# Patient Record
Sex: FEMALE | Race: WHITE | NOT HISPANIC OR LATINO | URBAN - METROPOLITAN AREA
[De-identification: names, ages, dates, MRNs, and addresses within clinical notes are randomized per-mention and may not be internally consistent; named-entity substitution may affect disease eponyms.]

---

## 2017-03-02 ENCOUNTER — OUTPATIENT (OUTPATIENT)
Dept: OUTPATIENT SERVICES | Facility: HOSPITAL | Age: 32
LOS: 1 days | Discharge: HOME | End: 2017-03-02

## 2017-06-27 DIAGNOSIS — N84.0 POLYP OF CORPUS UTERI: ICD-10-CM

## 2017-06-27 DIAGNOSIS — N80.0 ENDOMETRIOSIS OF UTERUS: ICD-10-CM

## 2017-06-27 DIAGNOSIS — R10.2 PELVIC AND PERINEAL PAIN: ICD-10-CM

## 2018-02-17 ENCOUNTER — TRANSCRIPTION ENCOUNTER (OUTPATIENT)
Age: 33
End: 2018-02-17

## 2018-05-08 ENCOUNTER — OUTPATIENT (OUTPATIENT)
Dept: OUTPATIENT SERVICES | Facility: HOSPITAL | Age: 33
LOS: 1 days | Discharge: HOME | End: 2018-05-08

## 2018-05-08 DIAGNOSIS — R92.8 OTHER ABNORMAL AND INCONCLUSIVE FINDINGS ON DIAGNOSTIC IMAGING OF BREAST: ICD-10-CM

## 2018-06-28 ENCOUNTER — TRANSCRIPTION ENCOUNTER (OUTPATIENT)
Age: 33
End: 2018-06-28

## 2019-05-22 PROBLEM — Z00.00 ENCOUNTER FOR PREVENTIVE HEALTH EXAMINATION: Status: ACTIVE | Noted: 2019-05-22

## 2020-04-07 ENCOUNTER — OUTPATIENT (OUTPATIENT)
Dept: OUTPATIENT SERVICES | Facility: HOSPITAL | Age: 35
LOS: 1 days | Discharge: HOME | End: 2020-04-07

## 2020-04-07 DIAGNOSIS — F43.23 ADJUSTMENT DISORDER WITH MIXED ANXIETY AND DEPRESSED MOOD: ICD-10-CM

## 2020-12-30 ENCOUNTER — APPOINTMENT (OUTPATIENT)
Dept: NEUROLOGY | Facility: CLINIC | Age: 35
End: 2020-12-30
Payer: COMMERCIAL

## 2020-12-30 VITALS
BODY MASS INDEX: 29.99 KG/M2 | OXYGEN SATURATION: 98 % | HEIGHT: 65 IN | SYSTOLIC BLOOD PRESSURE: 129 MMHG | DIASTOLIC BLOOD PRESSURE: 78 MMHG | HEART RATE: 79 BPM | WEIGHT: 180 LBS | TEMPERATURE: 97.2 F

## 2020-12-30 DIAGNOSIS — E28.2 POLYCYSTIC OVARIAN SYNDROME: ICD-10-CM

## 2020-12-30 DIAGNOSIS — Z87.891 PERSONAL HISTORY OF NICOTINE DEPENDENCE: ICD-10-CM

## 2020-12-30 DIAGNOSIS — Z80.3 FAMILY HISTORY OF MALIGNANT NEOPLASM OF BREAST: ICD-10-CM

## 2020-12-30 DIAGNOSIS — Z83.49 FAMILY HISTORY OF OTHER ENDOCRINE, NUTRITIONAL AND METABOLIC DISEASES: ICD-10-CM

## 2020-12-30 DIAGNOSIS — Z82.49 FAMILY HISTORY OF ISCHEMIC HEART DISEASE AND OTHER DISEASES OF THE CIRCULATORY SYSTEM: ICD-10-CM

## 2020-12-30 DIAGNOSIS — J45.909 UNSPECIFIED ASTHMA, UNCOMPLICATED: ICD-10-CM

## 2020-12-30 DIAGNOSIS — Z72.89 OTHER PROBLEMS RELATED TO LIFESTYLE: ICD-10-CM

## 2020-12-30 DIAGNOSIS — E03.9 HYPOTHYROIDISM, UNSPECIFIED: ICD-10-CM

## 2020-12-30 DIAGNOSIS — C43.9 MALIGNANT MELANOMA OF SKIN, UNSPECIFIED: ICD-10-CM

## 2020-12-30 DIAGNOSIS — U07.1 COVID-19: ICD-10-CM

## 2020-12-30 DIAGNOSIS — Z87.42 PERSONAL HISTORY OF OTHER DISEASES OF THE FEMALE GENITAL TRACT: ICD-10-CM

## 2020-12-30 DIAGNOSIS — Z82.0 FAMILY HISTORY OF EPILEPSY AND OTHER DISEASES OF THE NERVOUS SYSTEM: ICD-10-CM

## 2020-12-30 DIAGNOSIS — Z82.3 FAMILY HISTORY OF STROKE: ICD-10-CM

## 2020-12-30 DIAGNOSIS — Z80.0 FAMILY HISTORY OF MALIGNANT NEOPLASM OF DIGESTIVE ORGANS: ICD-10-CM

## 2020-12-30 PROCEDURE — 99072 ADDL SUPL MATRL&STAF TM PHE: CPT

## 2020-12-30 PROCEDURE — 99204 OFFICE O/P NEW MOD 45 MIN: CPT

## 2020-12-30 RX ORDER — FAMOTIDINE 40 MG/1
40 TABLET, FILM COATED ORAL
Qty: 30 | Refills: 0 | Status: DISCONTINUED | COMMUNITY
Start: 2020-11-04

## 2020-12-30 RX ORDER — METFORMIN HYDROCHLORIDE 500 MG/1
500 TABLET, FILM COATED ORAL
Refills: 0 | Status: ACTIVE | COMMUNITY

## 2020-12-30 RX ORDER — OMEPRAZOLE 40 MG/1
40 CAPSULE, DELAYED RELEASE ORAL
Qty: 30 | Refills: 0 | Status: DISCONTINUED | COMMUNITY
Start: 2020-10-23

## 2020-12-30 RX ORDER — ONDANSETRON 4 MG/1
4 TABLET, ORALLY DISINTEGRATING ORAL
Qty: 28 | Refills: 0 | Status: DISCONTINUED | COMMUNITY
Start: 2020-11-25

## 2020-12-30 RX ORDER — LEVOTHYROXINE SODIUM 0.09 MG/1
88 TABLET ORAL
Refills: 0 | Status: ACTIVE | COMMUNITY

## 2020-12-30 RX ORDER — METFORMIN ER 500 MG 500 MG/1
500 TABLET ORAL
Qty: 180 | Refills: 0 | Status: DISCONTINUED | COMMUNITY
Start: 2020-12-10

## 2020-12-30 RX ORDER — DICYCLOMINE HYDROCHLORIDE 20 MG/1
20 TABLET ORAL
Qty: 90 | Refills: 0 | Status: ACTIVE | COMMUNITY
Start: 2020-12-21

## 2020-12-30 RX ORDER — FLUTICASONE FUROATE 100 UG/1
100 POWDER RESPIRATORY (INHALATION)
Qty: 30 | Refills: 0 | Status: DISCONTINUED | COMMUNITY
Start: 2020-06-29

## 2020-12-30 RX ORDER — SUCRALFATE 1 G/1
1 TABLET ORAL
Qty: 90 | Refills: 0 | Status: DISCONTINUED | COMMUNITY
Start: 2020-11-16

## 2020-12-30 RX ORDER — METFORMIN ER 750 MG 750 MG/1
750 TABLET ORAL
Qty: 90 | Refills: 0 | Status: COMPLETED | COMMUNITY
Start: 2020-11-30

## 2020-12-30 RX ORDER — LEVOTHYROXINE SODIUM 0.09 MG/1
88 TABLET ORAL
Qty: 90 | Refills: 0 | Status: DISCONTINUED | COMMUNITY
Start: 2020-09-30

## 2020-12-31 NOTE — PHYSICAL EXAM
[FreeTextEntry1] : Short term recall is mildly impaired (-1/5) as is attention and concentration (missed two on serial 7's).  MOCA v1 score was 28/30.  Remote memory, general fund of knowledge,  and language function were intact.  Pupils are equal, round and reactive to light and accommodation.  Funduscopic exam did not show any papilledema.  Visual fields are intact to confrontation.  Extraocular movements are full.  There is no nystagmus.  The facial muscles are symmetric.  Facial sensation is intact to light touch, temperature, and pinprick.  Hearing is intact to finger rub bilaterally.  Tongue is midline.  Palate elevates symmetrically.  Neck is supple.  There is no meningismus.  Shoulder shrugs are symmetric.  Motor exam demonstrates 5/5 strength in the proximal and distal muscles of the upper and lower extremities.  Tone and bulk were normal.  There is no pronator drift.  Reflexes are 2+ bilaterally in the biceps, triceps, brachioradialis, patellae and ankles.  Toes are downgoing.  There is no clonus.  Coordination demonstrates normal finger-to-nose, heel-to-shin and rapid alternating movements.  Gait demonstrates normal heel and toe walk.  Tandem gait is normal.  Sensory exam, normal light touch, pinprick, vibration sensation in upper and lower extremities. \par \par No abnormal tone was detected.  No involuntary movements were seen.\par \par The patient is well-developed, well-nourished female in no acute distress.  Cardiac exam demonstrates a regular rate and rhythm.  No murmurs.  Carotids are 2+ bilaterally.  No bruits.  Abdomen is soft, nontender, and nondistended.  Bowel sounds are present.  Extremities showed no clubbing, cyanosis or edema. \par \par \par

## 2020-12-31 NOTE — ASSESSMENT
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  She will f/u with GI for possible liver biopsy.\par \par Will obtain formal neuropsychologic testing to more fully assess her cognitive complaints and will order MRI brain w/o contrast to look for signal changes associated with Poncho disease.\par \par She will likely benefit from chelation therapy and  molecular testing for ATP7B mutations.\par \par Return in 3-4 weeks.

## 2020-12-31 NOTE — HISTORY OF PRESENT ILLNESS
[FreeTextEntry1] : Pt is 34 yo RH female presenting for general neurologic evaluation.  She reports a recent potential diagnosis of Poncho disease.  Went for eye exam and negative.  Urine test results are back and show elevated urine copper.\par \par States she had all abdominal symptoms, severe pain in stomach on left side diarrhea to constipation.  Nausea was big symptom.  States was having a lot of bile coming out of stomach and mucous around her stool.  Sx's started end of October.  GI specialist did endoscopy, sent stool out and found ceruloplasmin level was low and copper was low.  Did CT of abdomen, HIDA scan, hydro scan now.  Urine test (24 hour urine copper).\par \par When turned 30 has always been anemia, always very high amounts of B12.\par \par Serum copper level was 57 ug/dL ().\par Ceruloplasmin 13 (16-45).\par 24 hour urine copper = 120 mcg/24h (15-60)\par \par Mothers concern - memory loss, can't find her words, forgetting names of people - coworkers. Bad pains in upper and lower back, hip and ankles swollen and unsteady gait.\par \par Family history \par Maternal aunt MS, and mothers cousin with MS\par Fathers cousin with MS\par \par She has been complaining of pain in her spine. (lower back to right of midline, region from lower thoracic to lumbar area).\par \par Mother states years back she had very elevated B12 levels (>2,000) despite not supplementing with B12.  \par

## 2020-12-31 NOTE — REVIEW OF SYSTEMS
[Chills] : chills [Feeling Poorly] : feeling poorly [Feeling Tired] : feeling tired [Recent Weight Loss (___ Lbs)] : recent [unfilled] ~Ulb weight loss [Memory Lapses or Loss] : memory loss [Decr. Concentrating Ability] : decreased concentrating ability [Hand Weakness] :  hand weakness [Leg Weakness] : leg weakness [Poor Coordination] : poor coordination [Dizziness] : dizziness [Lightheadedness] : lightheadedness [Migraine Headache] : migraine headaches [Difficulty Walking] : difficulty walking [Sleep Disturbances] : sleep disturbances [Anxiety] : anxiety [Depression] : depression [Eyes Itch] : itching of the eyes [SOB on Exertion] : shortness of breath during exertion [Abdominal Pain] : abdominal pain [Vomiting] : vomiting [Constipation] : constipation [Diarrhea] : diarrhea [Dysuria] : dysuria [Joint Pain] : joint pain [Joint Swelling] : joint swelling [Joint Stiffness] : joint stiffness [Hot Flashes] : hot flashes [Easy Bleeding] : a tendency for easy bleeding [Easy Bruising] : a tendency for easy bruising [Fever] : no fever [Recent Weight Gain (___ Lbs)] : no recent weight gain [Confused or Disoriented] : no confusion [Facial Weakness] : no facial weakness [Arm Weakness] : no arm weakness [Numbness] : no numbness [Tingling] : no tingling [Seizures] : no convulsions [Fainting] : no fainting [Vertigo] : no vertigo [Cluster Headache] : no cluster headache [Tension Headache] : no tension-type headache [Inability to Walk] : able to walk [Ataxia] : no ataxia [Frequent Falls] : not falling [Suicidal] : not suicidal [Eye Pain] : no eye pain [Eyesight Problems] : no eyesight problems [Earache] : no earache [Loss Of Hearing] : no hearing loss [Heart Rate Is Slow] : the heart rate was not slow [Heart Rate Is Fast] : the heart rate was not fast [Chest Pain] : no chest pain [Palpitations] : no palpitations [Shortness Of Breath] : no shortness of breath [Wheezing] : no wheezing [Cough] : no cough [Heartburn] : no heartburn [Melena] : no melena [Incontinence] : no incontinence [Skin Lesions] : no skin lesions [Skin Wound] : no skin wound [Muscle Weakness] : no muscle weakness [FreeTextEntry2] : sometimes [de-identified] : perimenstrual, takes motrin for headaches.  walking is a little off at times [de-identified] : recent sleep trouble x 2 months, wakes up exhausted [FreeTextEntry7] : over past 2 months [FreeTextEntry9] : hip pain, bilateral .  back pain rigth lower [de-identified] : perimenstrual

## 2021-01-02 ENCOUNTER — OUTPATIENT (OUTPATIENT)
Dept: OUTPATIENT SERVICES | Facility: HOSPITAL | Age: 36
LOS: 1 days | Discharge: HOME | End: 2021-01-02
Payer: COMMERCIAL

## 2021-01-02 DIAGNOSIS — E83.01 WILSON'S DISEASE: ICD-10-CM

## 2021-01-02 PROCEDURE — 70551 MRI BRAIN STEM W/O DYE: CPT | Mod: 26

## 2021-01-04 ENCOUNTER — OUTPATIENT (OUTPATIENT)
Dept: OUTPATIENT SERVICES | Facility: HOSPITAL | Age: 36
LOS: 1 days | Discharge: HOME | End: 2021-01-04
Payer: COMMERCIAL

## 2021-01-04 VITALS
WEIGHT: 179.9 LBS | RESPIRATION RATE: 17 BRPM | HEART RATE: 65 BPM | TEMPERATURE: 97 F | OXYGEN SATURATION: 97 % | SYSTOLIC BLOOD PRESSURE: 115 MMHG | HEIGHT: 65 IN | DIASTOLIC BLOOD PRESSURE: 76 MMHG

## 2021-01-04 DIAGNOSIS — E83.01 WILSON'S DISEASE: ICD-10-CM

## 2021-01-04 DIAGNOSIS — E03.9 HYPOTHYROIDISM, UNSPECIFIED: ICD-10-CM

## 2021-01-04 DIAGNOSIS — J45.909 UNSPECIFIED ASTHMA, UNCOMPLICATED: ICD-10-CM

## 2021-01-04 DIAGNOSIS — Z01.818 ENCOUNTER FOR OTHER PREPROCEDURAL EXAMINATION: ICD-10-CM

## 2021-01-04 DIAGNOSIS — Z98.890 OTHER SPECIFIED POSTPROCEDURAL STATES: Chronic | ICD-10-CM

## 2021-01-04 DIAGNOSIS — K75.9 INFLAMMATORY LIVER DISEASE, UNSPECIFIED: ICD-10-CM

## 2021-01-04 LAB
ALBUMIN SERPL ELPH-MCNC: 4 G/DL — SIGNIFICANT CHANGE UP (ref 3.5–5.2)
ALP SERPL-CCNC: 66 U/L — SIGNIFICANT CHANGE UP (ref 30–115)
ALT FLD-CCNC: 12 U/L — SIGNIFICANT CHANGE UP (ref 0–41)
ANION GAP SERPL CALC-SCNC: 10 MMOL/L — SIGNIFICANT CHANGE UP (ref 7–14)
APTT BLD: 31.6 SEC — SIGNIFICANT CHANGE UP (ref 27–39.2)
AST SERPL-CCNC: 16 U/L — SIGNIFICANT CHANGE UP (ref 0–41)
BASOPHILS # BLD AUTO: 0.07 K/UL — SIGNIFICANT CHANGE UP (ref 0–0.2)
BASOPHILS NFR BLD AUTO: 1.5 % — HIGH (ref 0–1)
BILIRUB SERPL-MCNC: 0.4 MG/DL — SIGNIFICANT CHANGE UP (ref 0.2–1.2)
BUN SERPL-MCNC: 14 MG/DL — SIGNIFICANT CHANGE UP (ref 10–20)
CALCIUM SERPL-MCNC: 9.1 MG/DL — SIGNIFICANT CHANGE UP (ref 8.5–10.1)
CHLORIDE SERPL-SCNC: 103 MMOL/L — SIGNIFICANT CHANGE UP (ref 98–110)
CO2 SERPL-SCNC: 26 MMOL/L — SIGNIFICANT CHANGE UP (ref 17–32)
CREAT SERPL-MCNC: 0.6 MG/DL — LOW (ref 0.7–1.5)
EOSINOPHIL # BLD AUTO: 0.06 K/UL — SIGNIFICANT CHANGE UP (ref 0–0.7)
EOSINOPHIL NFR BLD AUTO: 1.3 % — SIGNIFICANT CHANGE UP (ref 0–8)
GLUCOSE SERPL-MCNC: 72 MG/DL — SIGNIFICANT CHANGE UP (ref 70–99)
HCT VFR BLD CALC: 43.6 % — SIGNIFICANT CHANGE UP (ref 37–47)
HGB BLD-MCNC: 13.7 G/DL — SIGNIFICANT CHANGE UP (ref 12–16)
IMM GRANULOCYTES NFR BLD AUTO: 0.2 % — SIGNIFICANT CHANGE UP (ref 0.1–0.3)
INR BLD: 1.08 RATIO — SIGNIFICANT CHANGE UP (ref 0.65–1.3)
LYMPHOCYTES # BLD AUTO: 1.32 K/UL — SIGNIFICANT CHANGE UP (ref 1.2–3.4)
LYMPHOCYTES # BLD AUTO: 28.1 % — SIGNIFICANT CHANGE UP (ref 20.5–51.1)
MCHC RBC-ENTMCNC: 27.6 PG — SIGNIFICANT CHANGE UP (ref 27–31)
MCHC RBC-ENTMCNC: 31.4 G/DL — LOW (ref 32–37)
MCV RBC AUTO: 87.9 FL — SIGNIFICANT CHANGE UP (ref 81–99)
MONOCYTES # BLD AUTO: 0.34 K/UL — SIGNIFICANT CHANGE UP (ref 0.1–0.6)
MONOCYTES NFR BLD AUTO: 7.2 % — SIGNIFICANT CHANGE UP (ref 1.7–9.3)
NEUTROPHILS # BLD AUTO: 2.9 K/UL — SIGNIFICANT CHANGE UP (ref 1.4–6.5)
NEUTROPHILS NFR BLD AUTO: 61.7 % — SIGNIFICANT CHANGE UP (ref 42.2–75.2)
NRBC # BLD: 0 /100 WBCS — SIGNIFICANT CHANGE UP (ref 0–0)
PLATELET # BLD AUTO: 233 K/UL — SIGNIFICANT CHANGE UP (ref 130–400)
POTASSIUM SERPL-MCNC: 4.6 MMOL/L — SIGNIFICANT CHANGE UP (ref 3.5–5)
POTASSIUM SERPL-SCNC: 4.6 MMOL/L — SIGNIFICANT CHANGE UP (ref 3.5–5)
PROT SERPL-MCNC: 6.6 G/DL — SIGNIFICANT CHANGE UP (ref 6–8)
PROTHROM AB SERPL-ACNC: 12.4 SEC — SIGNIFICANT CHANGE UP (ref 9.95–12.87)
RBC # BLD: 4.96 M/UL — SIGNIFICANT CHANGE UP (ref 4.2–5.4)
RBC # FLD: 13.3 % — SIGNIFICANT CHANGE UP (ref 11.5–14.5)
SODIUM SERPL-SCNC: 139 MMOL/L — SIGNIFICANT CHANGE UP (ref 135–146)
WBC # BLD: 4.7 K/UL — LOW (ref 4.8–10.8)
WBC # FLD AUTO: 4.7 K/UL — LOW (ref 4.8–10.8)

## 2021-01-04 PROCEDURE — 93010 ELECTROCARDIOGRAM REPORT: CPT

## 2021-01-04 RX ORDER — LEVOTHYROXINE SODIUM 125 MCG
1 TABLET ORAL
Qty: 0 | Refills: 0 | DISCHARGE

## 2021-01-04 RX ORDER — METFORMIN HYDROCHLORIDE 850 MG/1
1 TABLET ORAL
Qty: 0 | Refills: 0 | DISCHARGE

## 2021-01-04 NOTE — H&P PST ADULT - REASON FOR ADMISSION
36 Y/O FEMALE HERE FOR PRE-ADMISSION SURGICAL TESTING. PATIENT REPORTS HEAVY VAGINAL BLEEDING, DIZZINESS, DIARRHEA X1 YR. HAD ANEMIA SINCE AGE 30. NOW NEEDS A LIVER BX TO R/O WILSONS DISEASE.??   NOW FOR SCHEDULED PROCEDURE.

## 2021-01-04 NOTE — H&P PST ADULT - HISTORY OF PRESENT ILLNESS
PATIENT DENIES CHEST PAIN, PALPITATIONS, COUGHING, FEVER, DYSURIA.  +sob w/ exertion  CAN WALK UP 2-3 FLIGHTS OF STEPS WITHOUT SOB.    NO COUGH, FEVER, SORE THROAT, HEADACHE, LOSS OF TASTE OR SMELL. NO KNOWN EXPOSURE TO ANYONE WITH COVID. PATIENT WAS INSTRUCTED TO ISOLATE FROM NOW UNTIL THE SURGERY.  PATIENT HAD COVOD-19 MARCH 2020.    Anesthesia Alert  NO--Difficult Airway  NO--History of neck surgery or radiation  NO--Limited ROM of neck  NO--History of Malignant hyperthermia  NO--Personal or family history of Pseudocholinesterase deficiency  NO--Prior Anesthesia Complication  NO--Latex Allergy  NO--Loose teeth  NO--History of Rheumatoid Arthritis  NO--VERO

## 2021-01-04 NOTE — H&P PST ADULT - NSICDXPASTMEDICALHX_GEN_ALL_CORE_FT
PAST MEDICAL HISTORY:  Asthma NO ATTACKS    H/O diarrhea     Hypothyroidism     PCOS (polycystic ovarian syndrome)

## 2021-01-04 NOTE — H&P PST ADULT - NSICDXPASTSURGICALHX_GEN_ALL_CORE_FT
PAST SURGICAL HISTORY:  H/O melanoma excision LEFT CHEEK    History of exploratory laparotomy FOR ENDOMETRIOSIS    S/P excision of fibroadenoma of breast LEFT

## 2021-01-06 PROBLEM — E03.9 HYPOTHYROIDISM, UNSPECIFIED: Chronic | Status: ACTIVE | Noted: 2021-01-04

## 2021-01-06 PROBLEM — J45.909 UNSPECIFIED ASTHMA, UNCOMPLICATED: Chronic | Status: ACTIVE | Noted: 2021-01-04

## 2021-01-06 PROBLEM — E28.2 POLYCYSTIC OVARIAN SYNDROME: Chronic | Status: ACTIVE | Noted: 2021-01-04

## 2021-01-06 PROBLEM — Z87.898 PERSONAL HISTORY OF OTHER SPECIFIED CONDITIONS: Chronic | Status: ACTIVE | Noted: 2021-01-04

## 2021-01-08 ENCOUNTER — OUTPATIENT (OUTPATIENT)
Dept: OUTPATIENT SERVICES | Facility: HOSPITAL | Age: 36
LOS: 1 days | Discharge: HOME | End: 2021-01-08

## 2021-01-08 DIAGNOSIS — Z98.890 OTHER SPECIFIED POSTPROCEDURAL STATES: Chronic | ICD-10-CM

## 2021-01-08 DIAGNOSIS — Z11.59 ENCOUNTER FOR SCREENING FOR OTHER VIRAL DISEASES: ICD-10-CM

## 2021-01-11 ENCOUNTER — RESULT REVIEW (OUTPATIENT)
Age: 36
End: 2021-01-11

## 2021-01-11 ENCOUNTER — OUTPATIENT (OUTPATIENT)
Dept: OUTPATIENT SERVICES | Facility: HOSPITAL | Age: 36
LOS: 1 days | Discharge: HOME | End: 2021-01-11
Payer: COMMERCIAL

## 2021-01-11 DIAGNOSIS — Z98.890 OTHER SPECIFIED POSTPROCEDURAL STATES: Chronic | ICD-10-CM

## 2021-01-11 PROCEDURE — 47000 NEEDLE BIOPSY OF LIVER PERQ: CPT

## 2021-01-11 PROCEDURE — 88313 SPECIAL STAINS GROUP 2: CPT | Mod: 26

## 2021-01-11 PROCEDURE — 88312 SPECIAL STAINS GROUP 1: CPT | Mod: 26

## 2021-01-11 PROCEDURE — 88307 TISSUE EXAM BY PATHOLOGIST: CPT | Mod: 26

## 2021-01-11 PROCEDURE — 99152 MOD SED SAME PHYS/QHP 5/>YRS: CPT

## 2021-01-11 PROCEDURE — 76942 ECHO GUIDE FOR BIOPSY: CPT | Mod: 26

## 2021-01-11 NOTE — PROGRESS NOTE ADULT - SUBJECTIVE AND OBJECTIVE BOX
PRE-OPERATIVE DAY OF PROCEDURE EVALUATION:     I have personally seen and examined this patient.  I agree with the history and physical which I have reviewed and noted any changes below:     Plan is for percutaneous, image-guided core needle liver biopsy with intravenous conscious sedation.    Procedure/ risks/ benefits/ goals/ alternatives were explained.  All questions answered.  Informed content obtained from patient.  Consent placed in chart.

## 2021-01-13 ENCOUNTER — APPOINTMENT (OUTPATIENT)
Dept: NEUROLOGY | Facility: CLINIC | Age: 36
End: 2021-01-13
Payer: COMMERCIAL

## 2021-01-13 VITALS
TEMPERATURE: 98.6 F | WEIGHT: 180 LBS | DIASTOLIC BLOOD PRESSURE: 79 MMHG | HEIGHT: 65 IN | SYSTOLIC BLOOD PRESSURE: 123 MMHG | BODY MASS INDEX: 29.99 KG/M2 | OXYGEN SATURATION: 98 % | HEART RATE: 90 BPM

## 2021-01-13 DIAGNOSIS — G25.81 RESTLESS LEGS SYNDROME: ICD-10-CM

## 2021-01-13 DIAGNOSIS — R26.89 OTHER ABNORMALITIES OF GAIT AND MOBILITY: ICD-10-CM

## 2021-01-13 DIAGNOSIS — R41.3 OTHER AMNESIA: ICD-10-CM

## 2021-01-13 DIAGNOSIS — R25.1 TREMOR, UNSPECIFIED: ICD-10-CM

## 2021-01-13 DIAGNOSIS — D64.9 ANEMIA, UNSPECIFIED: ICD-10-CM

## 2021-01-13 DIAGNOSIS — E83.01 WILSON'S DISEASE: ICD-10-CM

## 2021-01-13 PROCEDURE — 99072 ADDL SUPL MATRL&STAF TM PHE: CPT

## 2021-01-13 PROCEDURE — 99215 OFFICE O/P EST HI 40 MIN: CPT

## 2021-01-13 RX ORDER — DIAZEPAM 5 MG/1
5 TABLET ORAL
Qty: 2 | Refills: 0 | Status: ACTIVE | COMMUNITY
Start: 2020-12-31 | End: 1900-01-01

## 2021-01-13 NOTE — PHYSICAL EXAM
[Person] : oriented to person [Place] : oriented to place [Time] : oriented to time [Concentration Intact] : normal concentrating ability [Comprehension] : comprehension intact [Cranial Nerves Optic (II)] : visual acuity intact bilaterally,  visual fields full to confrontation, pupils equal round and reactive to light [Cranial Nerves Oculomotor (III)] : extraocular motion intact [Cranial Nerves Trigeminal (V)] : facial sensation intact symmetrically [Cranial Nerves Facial (VII)] : face symmetrical [Cranial Nerves Vestibulocochlear (VIII)] : hearing was intact bilaterally [Cranial Nerves Glossopharyngeal (IX)] : tongue and palate midline [Cranial Nerves Accessory (XI - Cranial And Spinal)] : head turning and shoulder shrug symmetric [Cranial Nerves Hypoglossal (XII)] : there was no tongue deviation with protrusion [Motor Tone] : muscle tone was normal in all four extremities [Motor Strength] : muscle strength was normal in all four extremities [Involuntary Movements] : no involuntary movements were seen [No Muscle Atrophy] : normal bulk in all four extremities [Paresis Pronator Drift Left-Sided] : no pronator drift on the left [Paresis Pronator Drift Right-Sided] : no pronator drift on the right [Sensation Tactile Decrease] : light touch was intact [4+] : Ankle jerk left 4+ [Plantar Reflex Right Only] : normal on the right [Plantar Reflex Left Only] : normal on the left [FreeTextEntry1] : There were no tremors observed today at rest, with position or action.\par There was slowness in movement with the left hand with finger tapping, and mild ataxia with finger to nose when eyes were closed. There was mild overshooting with finger mirroring bilaterally. \par Positive Romberg. Normal heel to shin. \par Gait was normal. Able to tandem walk, walk on heels and toes. \par Postural reflexes were intact. \par \par There was mild abnormal posturing of the fingers of the left hand.  [FreeTextEntry9] : No Chapa's sign

## 2021-01-13 NOTE — HISTORY OF PRESENT ILLNESS
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 She is accompanied by her Mother who provides collateral history.\par \par The patient had a high serum copper level and two elevated urine copper levels. She had a liver biopsy on Monday, per her GI specialist to assess for Poncho's disease. Her issues began with diarrhea alternating with constipation, nausea, decreased appetite, and general stomach issues. \par \par She reports that she feels stiffness in her muscles. She reports cramping in her thighs and tingling in her legs at night, when she gets into bed. During the day she does not have these symptoms. She reports that she does get the urge to move her legs at night and getting up to walk around does mildly help. \par She has noticed that she gets a left hand tremor intermittently, that is not interfering with her daily activities. Sometimes she feels an internal type tremor. The tremors occur at night, not a flapping tremor, but left hand will shake. \par She has some trouble swallowing, both liquids and solids. \par She is having memory issues, she is forgetful, if she is distracted she cannot multitask\par \par Her hand goes into a position and then freezes there until she massages it out.\par She has unsteady gait, leaning to the left. Not falling, but has had some near falls.\par \par Her mother is interested in getting at MRI of the L spine with and without contrast. Four members of the mother's family have MS. She has discomfort, pain and numbness/tingling, and the patient's mother would like to be sure that MS is not the reason why. She had an MRI of the brain without contrast that did not show any T2 FLAIR changes or T1 holes. They don't want an LP until the liver biopsy is back. \par \par No loss of vision or blurry vision.\par Her walking is slower, she takes her time because she feels unsteady. \par \par She has intermittent headaches. Around her period she has more intense headaches. She is sensitive to light and sound, she started in her early thirties. She takes a Motrin and it is relieved, same day. She gets nausea associated with the headache. She gets rare headaches aside from her period.\par \par She has intermittent constipation, alternates with diarrhea.\par No trouble with urination. \par She wakes up with muscle stiffness. \par She has to walk around when she gets the feeling. \par She has a history of anemia but has not had recent iron studies. \par \par She has not done any physical therapy.\par She has not had any genetic testing.

## 2021-01-13 NOTE — DISCUSSION/SUMMARY
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 She is accompanied by her Mother who provides collateral history.\par \par MRI of the brain without contrast was reviewed with the patient and her mother. \par \par The patient's examination was negative for tremors. There was mild left sided ataxia, and positive Romberg sign, though whether her vestibular system is involved is questionable. There were no abnormalities on MRI in the cerebellum. \par \par Her history is concerning for possible Restless legs syndrome with urge to move her legs when she lies down at night. Given the patient's history of severe iron deficiency anemia, repeat iron studies are necessary to determine the appropriate treatment for her as her sleep is affected by this.\par \par There were no signs of demyelination on the patient's MRI of the brain without contrast, however, this does not completely rule out a demyelinating syndrome. Thus, further investigations are reasonable. Her reflexes were brisk bilaterally, but plantar response and Chapa's sign were negative. We discussed that young females can have brisk reflexes that are not necessarily pathologic. She does have a strong family history of MS in 4 family members on her maternal side.\par \par The patient appears to have migraines that center around her menstruation, but are relieved quickly by Motrin. She does not qualify for migraine prophylaxis.\par \par Recommendations:\par - PT for gait and balance training\par - Iron with TIBC, Ferritin and Transferrin levels\par - MRI C spine with and without contrast to assess for signs of demyelination (Valium provided). Will consider T spine in the future if necessary and/or lumbar puncture\par - NMO Ab serum testing\par - Obtain liver biopsy results\par \par RTC 6 weeks

## 2021-01-14 LAB
FERRITIN SERPL-MCNC: 10 NG/ML
IRON SATN MFR SERPL: 16 %
IRON SERPL-MCNC: 58 UG/DL
SURGICAL PATHOLOGY STUDY: SIGNIFICANT CHANGE UP
TIBC SERPL-MCNC: 359 UG/DL
TRANSFERRIN SERPL-MCNC: 326 MG/DL
UIBC SERPL-MCNC: 300 UG/DL

## 2021-01-15 ENCOUNTER — OUTPATIENT (OUTPATIENT)
Dept: OUTPATIENT SERVICES | Facility: HOSPITAL | Age: 36
LOS: 1 days | Discharge: HOME | End: 2021-01-15
Payer: COMMERCIAL

## 2021-01-15 DIAGNOSIS — Z98.890 OTHER SPECIFIED POSTPROCEDURAL STATES: Chronic | ICD-10-CM

## 2021-01-15 DIAGNOSIS — E83.01 WILSON'S DISEASE: ICD-10-CM

## 2021-01-15 LAB — AQP4 H2O CHANNEL AB SERPL IA-ACNC: <1.5 U/ML

## 2021-01-15 PROCEDURE — 72156 MRI NECK SPINE W/O & W/DYE: CPT | Mod: 26

## 2021-01-20 ENCOUNTER — NON-APPOINTMENT (OUTPATIENT)
Age: 36
End: 2021-01-20

## 2021-01-25 DIAGNOSIS — E03.9 HYPOTHYROIDISM, UNSPECIFIED: ICD-10-CM

## 2021-01-25 DIAGNOSIS — J45.909 UNSPECIFIED ASTHMA, UNCOMPLICATED: ICD-10-CM

## 2021-01-25 DIAGNOSIS — E83.01 WILSON'S DISEASE: ICD-10-CM

## 2021-01-25 DIAGNOSIS — K75.9 INFLAMMATORY LIVER DISEASE, UNSPECIFIED: ICD-10-CM

## 2021-03-09 ENCOUNTER — NON-APPOINTMENT (OUTPATIENT)
Age: 36
End: 2021-03-09

## 2021-03-10 ENCOUNTER — OUTPATIENT (OUTPATIENT)
Dept: OUTPATIENT SERVICES | Facility: HOSPITAL | Age: 36
LOS: 1 days | Discharge: HOME | End: 2021-03-10

## 2021-03-10 ENCOUNTER — LABORATORY RESULT (OUTPATIENT)
Age: 36
End: 2021-03-10

## 2021-03-10 DIAGNOSIS — Z11.59 ENCOUNTER FOR SCREENING FOR OTHER VIRAL DISEASES: ICD-10-CM

## 2021-03-10 DIAGNOSIS — Z98.890 OTHER SPECIFIED POSTPROCEDURAL STATES: Chronic | ICD-10-CM

## 2021-03-16 ENCOUNTER — FORM ENCOUNTER (OUTPATIENT)
Age: 36
End: 2021-03-16

## 2021-03-17 ENCOUNTER — APPOINTMENT (OUTPATIENT)
Dept: NEUROLOGY | Facility: CLINIC | Age: 36
End: 2021-03-17

## 2021-03-24 ENCOUNTER — APPOINTMENT (OUTPATIENT)
Dept: NEUROLOGY | Facility: CLINIC | Age: 36
End: 2021-03-24

## 2021-04-14 ENCOUNTER — APPOINTMENT (OUTPATIENT)
Dept: NEUROLOGY | Facility: CLINIC | Age: 36
End: 2021-04-14

## 2021-04-25 ENCOUNTER — FORM ENCOUNTER (OUTPATIENT)
Age: 36
End: 2021-04-25

## 2021-04-28 ENCOUNTER — APPOINTMENT (OUTPATIENT)
Dept: NEUROLOGY | Facility: CLINIC | Age: 36
End: 2021-04-28

## 2021-05-06 ENCOUNTER — FORM ENCOUNTER (OUTPATIENT)
Age: 36
End: 2021-05-06

## 2021-05-07 ENCOUNTER — OUTPATIENT (OUTPATIENT)
Dept: OUTPATIENT SERVICES | Facility: HOSPITAL | Age: 36
LOS: 1 days | Discharge: HOME | End: 2021-05-07

## 2021-05-07 DIAGNOSIS — R41.3 OTHER AMNESIA: ICD-10-CM

## 2021-05-07 DIAGNOSIS — Z98.890 OTHER SPECIFIED POSTPROCEDURAL STATES: Chronic | ICD-10-CM

## 2021-05-07 DIAGNOSIS — G31.84 MILD COGNITIVE IMPAIRMENT OF UNCERTAIN OR UNKNOWN ETIOLOGY: ICD-10-CM

## 2021-05-07 DIAGNOSIS — E83.01 WILSON'S DISEASE: ICD-10-CM

## 2021-05-26 LAB — CERULOPLASMIN SERPL-MCNC: 12 MG/DL

## 2021-05-28 LAB
COPPER 24H UR-MCNC: 18 UG/24 HR
COPPER SERPL-MCNC: 56 UG/DL
COPPER UR-MCNC: 6 UG/L
COPPER/CREATININE RATIO: 14 UG/G CREAT
CREATININE, URINE: 0.42 G/L

## 2021-06-07 ENCOUNTER — APPOINTMENT (OUTPATIENT)
Dept: NEUROLOGY | Facility: CLINIC | Age: 36
End: 2021-06-07
Payer: COMMERCIAL

## 2021-06-07 VITALS
OXYGEN SATURATION: 98 % | WEIGHT: 180 LBS | HEIGHT: 65 IN | SYSTOLIC BLOOD PRESSURE: 106 MMHG | TEMPERATURE: 98 F | DIASTOLIC BLOOD PRESSURE: 72 MMHG | BODY MASS INDEX: 29.99 KG/M2 | HEART RATE: 84 BPM

## 2021-06-07 DIAGNOSIS — R41.3 OTHER AMNESIA: ICD-10-CM

## 2021-06-07 PROCEDURE — 99214 OFFICE O/P EST MOD 30 MIN: CPT

## 2021-06-07 PROCEDURE — 99072 ADDL SUPL MATRL&STAF TM PHE: CPT

## 2021-06-07 NOTE — PHYSICAL EXAM
[FreeTextEntry1] : Normal facial expression.\par No abnormal involuntary movements are observed.\par No wing beating tremor.\par Normal finger to nose.\par No cogwheeling at wrists.\par Normal gait.

## 2021-06-07 NOTE — HISTORY OF PRESENT ILLNESS
[FreeTextEntry1] : Pt presents for f/u of question of Wilsons.  \par Saw specialist at [place] and had Ceruloplasmin is still low 12 mg/dL (16-45). Urine copper was low at 56 ug/dL . (Note that Leipzig score system for Poncho's likelihood assigns points for increased urinary copper).  ATP7B Poncho testing was negative.\par \par MRI brain Jan '21 was normal.\par \par Neuropsychologic testing March 2021:\par 1.  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  Anxiety disorder, unspecified.\par 3.  Depressive disorder, unspecified.\par \par She plans to f/u local and will be seeing hepatologist Dr. Hung at [facility] in [place].\par \par Had neuropsychologic evaluation and told post Covid and Poncho's.  \par Mother states she had genetic testing and shows she is a ? carrier.\par \par Mother and daughter wants to know does she have it (Poncho's) or not.  She has pending slit lamp exam.\par \par She still c/o of memory loss.  \par Patient states she embeds herself in her job because it gives her shelley.  \par \par Mother states her mothers 3 brothers had tremors.  94 year old great aunt said two of them had parkinsons.  \par \par \par \par

## 2021-07-16 ENCOUNTER — APPOINTMENT (OUTPATIENT)
Dept: NEUROLOGY | Facility: CLINIC | Age: 36
End: 2021-07-16

## 2021-07-19 NOTE — ASSESSMENT
[FreeTextEntry1] : Patient has mild neurocognitive disorder, anxiety and depression by neuropsychologic testing.  Some a normalities re: ceruloplasmin but low urinary copper.  Genetic testing apparently negative for Poncho's and urinary copper levels are low.  Patient has pending appointment with hepatologist in [place] regarding ongoing concern about possible Poncho disease vs carrier and whether any treatment is warranted.  The cause of memory c/o is not clear at this time.  She will f/u with GI for ferritin replacement as this may help with RLS and sleep and that may improve cognition.  Mother is interested in her trying huperzine A.  Will hold off any prescription memory aids for now.  I suggested followup neuropsychologic testing in one year as this will help differentiate post covid encephalopathy (should show some improvement) vs other process that could lead to progressive cognitive loss.
Offered and patient declined

## 2022-07-13 NOTE — H&P PST ADULT - GASTROINTESTINAL
Soft, non-tender, no hepatosplenomegaly, normal bowel sounds Complex Repair And Rotation Flap Text: The defect edges were debeveled with a #15 scalpel blade.  The primary defect was closed partially with a complex linear closure.  Given the location of the remaining defect, shape of the defect and the proximity to free margins a rotation flap was deemed most appropriate for complete closure of the defect.  Using a sterile surgical marker, an appropriate advancement flap was drawn incorporating the defect and placing the expected incisions within the relaxed skin tension lines where possible.    The area thus outlined was incised deep to adipose tissue with a #15 scalpel blade.  The skin margins were undermined to an appropriate distance in all directions utilizing iris scissors.

## 2023-05-22 ENCOUNTER — NON-APPOINTMENT (OUTPATIENT)
Age: 38
End: 2023-05-22

## 2024-02-23 NOTE — H&P PST ADULT - NS PRO AD NO ADVANCE DIRECTIVE
[employer] Approved    Filling Pharmacy: [facility]  Additional Information: Pharmacy contacted, left detailed message with approval information. No

## 2024-08-13 ENCOUNTER — EMERGENCY (EMERGENCY)
Facility: HOSPITAL | Age: 39
LOS: 0 days | Discharge: HOME | End: 2024-08-13
Attending: EMERGENCY MEDICINE
Payer: COMMERCIAL

## 2024-08-13 VITALS
DIASTOLIC BLOOD PRESSURE: 87 MMHG | HEART RATE: 94 BPM | WEIGHT: 192.9 LBS | TEMPERATURE: 99 F | RESPIRATION RATE: 16 BRPM | SYSTOLIC BLOOD PRESSURE: 122 MMHG | OXYGEN SATURATION: 98 %

## 2024-08-13 VITALS
RESPIRATION RATE: 16 BRPM | SYSTOLIC BLOOD PRESSURE: 126 MMHG | DIASTOLIC BLOOD PRESSURE: 73 MMHG | HEART RATE: 87 BPM | OXYGEN SATURATION: 98 %

## 2024-08-13 DIAGNOSIS — E03.9 HYPOTHYROIDISM, UNSPECIFIED: ICD-10-CM

## 2024-08-13 DIAGNOSIS — Z3A.10 10 WEEKS GESTATION OF PREGNANCY: ICD-10-CM

## 2024-08-13 DIAGNOSIS — Z98.890 OTHER SPECIFIED POSTPROCEDURAL STATES: Chronic | ICD-10-CM

## 2024-08-13 DIAGNOSIS — O21.9 VOMITING OF PREGNANCY, UNSPECIFIED: ICD-10-CM

## 2024-08-13 LAB
ALBUMIN SERPL ELPH-MCNC: 4.2 G/DL — SIGNIFICANT CHANGE UP (ref 3.5–5.2)
ALP SERPL-CCNC: 56 U/L — SIGNIFICANT CHANGE UP (ref 30–115)
ALT FLD-CCNC: 15 U/L — SIGNIFICANT CHANGE UP (ref 0–41)
ANION GAP SERPL CALC-SCNC: 12 MMOL/L — SIGNIFICANT CHANGE UP (ref 7–14)
APPEARANCE UR: ABNORMAL
AST SERPL-CCNC: 16 U/L — SIGNIFICANT CHANGE UP (ref 0–41)
BACTERIA # UR AUTO: ABNORMAL /HPF
BASOPHILS # BLD AUTO: 0.04 K/UL — SIGNIFICANT CHANGE UP (ref 0–0.2)
BASOPHILS NFR BLD AUTO: 0.6 % — SIGNIFICANT CHANGE UP (ref 0–1)
BILIRUB DIRECT SERPL-MCNC: <0.2 MG/DL — SIGNIFICANT CHANGE UP (ref 0–0.3)
BILIRUB INDIRECT FLD-MCNC: >0.3 MG/DL — SIGNIFICANT CHANGE UP (ref 0.2–1.2)
BILIRUB SERPL-MCNC: 0.5 MG/DL — SIGNIFICANT CHANGE UP (ref 0.2–1.2)
BILIRUB UR-MCNC: NEGATIVE — SIGNIFICANT CHANGE UP
BUN SERPL-MCNC: 11 MG/DL — SIGNIFICANT CHANGE UP (ref 10–20)
CALCIUM SERPL-MCNC: 9.3 MG/DL — SIGNIFICANT CHANGE UP (ref 8.4–10.5)
CHLORIDE SERPL-SCNC: 99 MMOL/L — SIGNIFICANT CHANGE UP (ref 98–110)
CO2 SERPL-SCNC: 24 MMOL/L — SIGNIFICANT CHANGE UP (ref 17–32)
COLOR SPEC: SIGNIFICANT CHANGE UP
COMMENT - URINE: SIGNIFICANT CHANGE UP
CREAT SERPL-MCNC: 0.6 MG/DL — LOW (ref 0.7–1.5)
DIFF PNL FLD: NEGATIVE — SIGNIFICANT CHANGE UP
EGFR: 117 ML/MIN/1.73M2 — SIGNIFICANT CHANGE UP
EOSINOPHIL # BLD AUTO: 0.03 K/UL — SIGNIFICANT CHANGE UP (ref 0–0.7)
EOSINOPHIL NFR BLD AUTO: 0.4 % — SIGNIFICANT CHANGE UP (ref 0–8)
EPI CELLS # UR: PRESENT
GLUCOSE SERPL-MCNC: 84 MG/DL — SIGNIFICANT CHANGE UP (ref 70–99)
GLUCOSE UR QL: NEGATIVE MG/DL — SIGNIFICANT CHANGE UP
HCG SERPL-ACNC: HIGH MIU/ML
HCT VFR BLD CALC: 40.5 % — SIGNIFICANT CHANGE UP (ref 37–47)
HGB BLD-MCNC: 13.9 G/DL — SIGNIFICANT CHANGE UP (ref 12–16)
IMM GRANULOCYTES NFR BLD AUTO: 0.3 % — SIGNIFICANT CHANGE UP (ref 0.1–0.3)
KETONES UR-MCNC: NEGATIVE MG/DL — SIGNIFICANT CHANGE UP
LEUKOCYTE ESTERASE UR-ACNC: ABNORMAL
LIDOCAIN IGE QN: 16 U/L — SIGNIFICANT CHANGE UP (ref 7–60)
LYMPHOCYTES # BLD AUTO: 1.7 K/UL — SIGNIFICANT CHANGE UP (ref 1.2–3.4)
LYMPHOCYTES # BLD AUTO: 24.1 % — SIGNIFICANT CHANGE UP (ref 20.5–51.1)
MCHC RBC-ENTMCNC: 28.7 PG — SIGNIFICANT CHANGE UP (ref 27–31)
MCHC RBC-ENTMCNC: 34.3 G/DL — SIGNIFICANT CHANGE UP (ref 32–37)
MCV RBC AUTO: 83.5 FL — SIGNIFICANT CHANGE UP (ref 81–99)
MONOCYTES # BLD AUTO: 0.51 K/UL — SIGNIFICANT CHANGE UP (ref 0.1–0.6)
MONOCYTES NFR BLD AUTO: 7.2 % — SIGNIFICANT CHANGE UP (ref 1.7–9.3)
NEUTROPHILS # BLD AUTO: 4.76 K/UL — SIGNIFICANT CHANGE UP (ref 1.4–6.5)
NEUTROPHILS NFR BLD AUTO: 67.4 % — SIGNIFICANT CHANGE UP (ref 42.2–75.2)
NITRITE UR-MCNC: NEGATIVE — SIGNIFICANT CHANGE UP
NRBC # BLD: 0 /100 WBCS — SIGNIFICANT CHANGE UP (ref 0–0)
PH UR: 6 — SIGNIFICANT CHANGE UP (ref 5–8)
PLATELET # BLD AUTO: 219 K/UL — SIGNIFICANT CHANGE UP (ref 130–400)
PMV BLD: 10.8 FL — HIGH (ref 7.4–10.4)
POTASSIUM SERPL-MCNC: 3.8 MMOL/L — SIGNIFICANT CHANGE UP (ref 3.5–5)
POTASSIUM SERPL-SCNC: 3.8 MMOL/L — SIGNIFICANT CHANGE UP (ref 3.5–5)
PROT SERPL-MCNC: 7.1 G/DL — SIGNIFICANT CHANGE UP (ref 6–8)
PROT UR-MCNC: 30 MG/DL
RBC # BLD: 4.85 M/UL — SIGNIFICANT CHANGE UP (ref 4.2–5.4)
RBC # FLD: 13.2 % — SIGNIFICANT CHANGE UP (ref 11.5–14.5)
RBC CASTS # UR COMP ASSIST: 1 /HPF — SIGNIFICANT CHANGE UP (ref 0–4)
SODIUM SERPL-SCNC: 135 MMOL/L — SIGNIFICANT CHANGE UP (ref 135–146)
SP GR SPEC: >1.03 — HIGH (ref 1–1.03)
UROBILINOGEN FLD QL: 1 MG/DL — SIGNIFICANT CHANGE UP (ref 0.2–1)
WBC # BLD: 7.06 K/UL — SIGNIFICANT CHANGE UP (ref 4.8–10.8)
WBC # FLD AUTO: 7.06 K/UL — SIGNIFICANT CHANGE UP (ref 4.8–10.8)
WBC UR QL: 12 /HPF — HIGH (ref 0–5)

## 2024-08-13 PROCEDURE — 36000 PLACE NEEDLE IN VEIN: CPT

## 2024-08-13 PROCEDURE — 81001 URINALYSIS AUTO W/SCOPE: CPT

## 2024-08-13 PROCEDURE — 36415 COLL VENOUS BLD VENIPUNCTURE: CPT

## 2024-08-13 PROCEDURE — 80048 BASIC METABOLIC PNL TOTAL CA: CPT

## 2024-08-13 PROCEDURE — 83690 ASSAY OF LIPASE: CPT

## 2024-08-13 PROCEDURE — 85025 COMPLETE CBC W/AUTO DIFF WBC: CPT

## 2024-08-13 PROCEDURE — 99283 EMERGENCY DEPT VISIT LOW MDM: CPT | Mod: 25

## 2024-08-13 PROCEDURE — 84702 CHORIONIC GONADOTROPIN TEST: CPT

## 2024-08-13 PROCEDURE — 80076 HEPATIC FUNCTION PANEL: CPT

## 2024-08-13 PROCEDURE — 99284 EMERGENCY DEPT VISIT MOD MDM: CPT

## 2024-08-13 RX ORDER — DEXTROSE MONOHYDRATE, SODIUM CHLORIDE, SODIUM LACTATE, CALCIUM CHLORIDE, MAGNESIUM CHLORIDE 1.5; 538; 448; 18.4; 5.08 G/100ML; MG/100ML; MG/100ML; MG/100ML; MG/100ML
2000 SOLUTION INTRAPERITONEAL ONCE
Refills: 0 | Status: COMPLETED | OUTPATIENT
Start: 2024-08-13 | End: 2024-08-13

## 2024-08-13 RX ADMIN — DEXTROSE MONOHYDRATE, SODIUM CHLORIDE, SODIUM LACTATE, CALCIUM CHLORIDE, MAGNESIUM CHLORIDE 2000 MILLILITER(S): 1.5; 538; 448; 18.4; 5.08 SOLUTION INTRAPERITONEAL at 19:58

## 2024-08-13 NOTE — ED PROVIDER NOTE - NSFOLLOWUPINSTRUCTIONS_ED_ALL_ED_FT
follow up with your primary care doctor and your OB/GYN in 1-2 days     Acute Nausea and Vomiting    WHAT YOU NEED TO KNOW:    Acute nausea and vomiting start suddenly, worsen quickly, and last a short time.    DISCHARGE INSTRUCTIONS:    Return to the emergency department if:     You see blood in your vomit or your bowel movements.      You have sudden, severe pain in your chest and upper abdomen after hard vomiting or retching.      You have swelling in your neck and chest.       You are dizzy, cold, and thirsty and your eyes and mouth are dry.      You are urinating very little or not at all.      You have muscle weakness, leg cramps, and trouble breathing.       Your heart is beating much faster than normal.       You continue to vomit for more than 48 hours.     Contact your healthcare provider if:     You have frequent dry heaves (vomiting but nothing comes out).      Your nausea and vomiting does not get better or go away after you use medicine.      You have questions or concerns about your condition or treatment.    Medicines: You may need any of the following:     Medicines may be given to calm your stomach and stop your vomiting. You may also need medicines to help you feel more relaxed or to stop nausea and vomiting caused by motion sickness.      Gastrointestinal stimulants are used to help empty your stomach and bowels. This may help decrease nausea and vomiting.      Take your medicine as directed. Contact your healthcare provider if you think your medicine is not helping or if you have side effects. Tell him or her if you are allergic to any medicine. Keep a list of the medicines, vitamins, and herbs you take. Include the amounts, and when and why you take them. Bring the list or the pill bottles to follow-up visits. Carry your medicine list with you in case of an emergency.    Prevent or manage acute nausea and vomiting:     Do not drink alcohol. Alcohol may upset or irritate your stomach. Too much alcohol can also cause acute nausea and vomiting.      Control stress. Headaches due to stress may cause nausea and vomiting. Find ways to relax and manage your stress. Get more rest and sleep.      Drink more liquids as directed. Vomiting can lead to dehydration. It is important to drink more liquids to help replace lost body fluids. Ask your healthcare provider how much liquid to drink each day and which liquids are best for you. Your provider may recommend that you drink an oral rehydration solution (ORS). ORS contains water, salts, and sugar that are needed to replace the lost body fluids. Ask what kind of ORS to use, how much to drink, and where to get it.      Eat smaller meals, more often. Eat small amounts of food every 2 to 3 hours, even if you are not hungry. Food in your stomach may decrease your nausea.      Talk to your healthcare provider before you take over-the-counter (OTC) medicines. These medicines can cause serious problems if you use certain other medicines, or you have a medical condition. You may have problems if you use too much or use them for longer than the label says. Follow directions on the label carefully.     Follow up with your healthcare provider as directed: Write down your questions so you remember to ask them during your follow-up visits.       © Copyright [employer] 2019 All illustrations and images included in CareNotes are the copyrighted property of [employer].A[employer]., Inc[employer] or [employer].

## 2024-08-13 NOTE — ED ADULT NURSE NOTE - NSICDXPASTMEDICALHX_GEN_ALL_CORE_FT
stated
(E4) spontaneous
PAST MEDICAL HISTORY:  Asthma NO ATTACKS    H/O diarrhea     Hypothyroidism     PCOS (polycystic ovarian syndrome)

## 2024-08-13 NOTE — ED PROVIDER NOTE - PATIENT PORTAL LINK FT
You can access the FollowMyHealth Patient Portal offered by [facility] by registering at the following website: [email or web address]. By joining [employer]’s FollowMyHealth portal, you will also be able to view your health information using other applications (apps) compatible with our system.

## 2024-08-13 NOTE — ED PROVIDER NOTE - OBJECTIVE STATEMENT
39 year old Female past medical history hypothyroid, currently 10 weeks pregnant,  comes to emergency room for nausea and vomiting since 7 weeks of pregnancy.  pt following with her obgyn Dr Fiore. Pt has  tried raglan and Zofran at home  but still with vomiting. Pt prescribed  Diclegis,  but came to ER for IVF. pt is refusing  Iv antiemetic. No abdominal pain vaginal bleeding no fever ,  last US yesterday.  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

## 2024-08-13 NOTE — ED PROVIDER NOTE - CLINICAL SUMMARY MEDICAL DECISION MAKING FREE TEXT BOX
39yF  pmhx  hypothyroid 10 weeks pregnant, pw  nv since 7 weeks of pregnancy.  pt following with her obgyn Dr Fiore,  has  tried reglan and zofran at home  but still with vomiting. Pt prescribed  diclegis,  but came to ER for IVF. pt is refusing  Iv antiemetic. No abdominal pain vaginal bleeding no fever ,  last US yesterday.  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 pt well appearing nontoxic   mmm abd soft nontender, Labs resulted at the time of my review were noted to be within acceptable parameters  hco3 24 , no ketones in urine   pt  wants to go home  Patient to be discharged from ED in well appearing condition. Any available test results were discussed with and printed  for patient.  Verbal instructions given, including instructions to return to ED immediately for any new, worsening, or concerning symptoms. Limitations of ED work up discussed.  Patient reports understanding of above with capacity and insight. Written discharge instructions additionally given, including follow-up plan.

## 2024-12-20 ENCOUNTER — OUTPATIENT (OUTPATIENT)
Dept: INPATIENT UNIT | Facility: HOSPITAL | Age: 39
LOS: 1 days | Discharge: HOME | End: 2024-12-20
Payer: COMMERCIAL

## 2024-12-20 VITALS — DIASTOLIC BLOOD PRESSURE: 59 MMHG | HEART RATE: 80 BPM | SYSTOLIC BLOOD PRESSURE: 119 MMHG

## 2024-12-20 VITALS — HEART RATE: 90 BPM | OXYGEN SATURATION: 96 %

## 2024-12-20 DIAGNOSIS — Z98.890 OTHER SPECIFIED POSTPROCEDURAL STATES: Chronic | ICD-10-CM

## 2024-12-20 DIAGNOSIS — O26.899 OTHER SPECIFIED PREGNANCY RELATED CONDITIONS, UNSPECIFIED TRIMESTER: ICD-10-CM

## 2024-12-20 PROCEDURE — 99214 OFFICE O/P EST MOD 30 MIN: CPT

## 2024-12-20 PROCEDURE — 59025 FETAL NON-STRESS TEST: CPT

## 2024-12-20 NOTE — OB PROVIDER TRIAGE NOTE - NSOBPROVIDERNOTE_OBGYN_ALL_OB_FT
40yo  @28w4d here for decreased fetal movement with reassuring maternal and fetal status   - BPP 10/10   - PTL labor precautions discussed  - fetal kick count advised   - f/u with PMD next week

## 2024-12-20 NOTE — OB RN TRIAGE NOTE - FALL HARM RISK - UNIVERSAL INTERVENTIONS
Bed in lowest position, wheels locked, appropriate side rails in place/Call bell, personal items and telephone in reach/Instruct patient to call for assistance before getting out of bed or chair/Non-slip footwear when patient is out of bed/[place] to call system/Physically safe environment - no spills, clutter or unnecessary equipment/Purposeful Proactive Rounding/Room/bathroom lighting operational, light cord in reach

## 2024-12-20 NOTE — OB PROVIDER TRIAGE NOTE - NS_OBGYNHISTORY_OBGYN_ALL_OB_FT
ObhX:      GynHx: denies h/o fibroids or ovarian cysts, abnormal pap smears or STI's. H/O hysteroscopic polypectomy and laparoscopic endometriosis surgery

## 2024-12-20 NOTE — OB PROVIDER TRIAGE NOTE - NSHPPHYSICALEXAM_GEN_ALL_CORE
Vital Signs Last 24 Hrs  T(C): 36.6 (20 Dec 2024 22:15), Max: 36.6 (20 Dec 2024 22:15)  T(F): 97.8 (20 Dec 2024 22:15), Max: 97.8 (20 Dec 2024 22:15)  HR: 86 (20 Dec 2024 22:48) (80 - 102)  BP: 119/58 (20 Dec 2024 22:16) (119/58 - 119/58)  RR: 18 (20 Dec 2024 22:15) (18 - 18)  SpO2: 97% (20 Dec 2024 22:48) (96% - 98%)    Gen: NAD, sitting comfortably  Abd: Gravid, soft, NT  EFM: 135/mod/+accels  [place]: none  Sono: cephalic, anterior BPP 8/8

## 2024-12-20 NOTE — OB PROVIDER TRIAGE NOTE - HISTORY OF PRESENT ILLNESS
38yo  @28w4d MAHOGANY 3/10/25 by early sono presents to L&D with complaints of decreased fetal movement today. Pt denies any abdominal pain, LOF or VB.     Pregnancy complicated by:   1) AMA on ASA   2) Hypothyroidism on 88mcg daily and 50mcg twice a week (88mcg pre preg)   3) h/o lumpectomy, hysteroscopic polypectomy, laparoscopic endometriosis surgery

## 2024-12-23 DIAGNOSIS — E03.9 HYPOTHYROIDISM, UNSPECIFIED: ICD-10-CM

## 2024-12-23 DIAGNOSIS — Z3A.28 28 WEEKS GESTATION OF PREGNANCY: ICD-10-CM

## 2024-12-23 DIAGNOSIS — O09.513 SUPERVISION OF ELDERLY PRIMIGRAVIDA, THIRD TRIMESTER: ICD-10-CM

## 2024-12-23 DIAGNOSIS — O36.8130 DECREASED FETAL MOVEMENTS, THIRD TRIMESTER, NOT APPLICABLE OR UNSPECIFIED: ICD-10-CM

## 2024-12-23 DIAGNOSIS — O99.283 ENDOCRINE, NUTRITIONAL AND METABOLIC DISEASES COMPLICATING PREGNANCY, THIRD TRIMESTER: ICD-10-CM

## 2025-07-15 NOTE — H&P PST ADULT - HARM RISK FACTORS
[facility] DEPARTMENT NOTE    Patient : Melanie Burns Age: 78 year old Sex: male   MRN: [id number] Encounter Date: 7/14/2025    History of Present Illness      Chief Complaint   Patient presents with    Respiratory Distress    Altered Mental Status       Melanie Burns is a 78 year old male presenting to the ED with EMS with altered mentation.  Patient was noted to have decreased level of consciousness and respiratory distress at nursing home.  Upon EMS arrival noted to have small pupils given Narcan twice with no response.  He saturating in 60s and 70s they placed him on nonrebreather and transported him here.  Patient unable to respond to any questions.    Discussed the patient with his wife on the phone.  She confirmed there was an advanced directive but no DNR/DNI type paperwork or intention on the patient's behalf.  .     Past Medical History     Allergies   Allergen Reactions    Beta Adrenergic Blockers DIZZINESS    Carvedilol Other (See Comments)     Extreme Fatigue and Weakness.  Loss of Balance    Gadolinium Other (See Comments)     agitation    Hydralazine Other (See Comments)     Chest pain    Quinolones Other (See Comments)     Pt was told to not take any Quinolones as a precaution due to a spontaneous achilles rupture (which did NOT happen from quinolone use).    Statins MYALGIA    Pears [Pear   (Food Or Med)] GI UPSET       Past Medical History:   Diagnosis Date    Adrenal crisis  (CMD)     Arthritis, rheumatoid (CMD)     Diabetes mellitus  (CMD)     Type 2    Diabetic ulcer of toe of right foot  (CMD) 12/24/2024    HTN (hypertension)     Lymphedema     Sleep apnea        Past Surgical History:   Procedure Laterality Date    ADRENALECTOMY      LUMBAR LAMINECTOMY  06/05/2025    PARATHYROIDECTOMY      RETINAL DETACHMENT SURGERY Left     TENDON REPAIR Left        Family History   Problem Relation Age of Onset    Diabetes Mother     Heart disease Father     Diabetes Sister        Social History      Tobacco Use    Smoking status: Former     Types: Cigarettes     Passive exposure: Never    Smokeless tobacco: Never   Vaping Use    Vaping status: never used   Substance Use Topics    Alcohol use: Yes     Comment: social    Drug use: Never       Physical Exam     ED Triage Vitals   ED Triage Vitals Group      Temp 07/14/25 2318 95.7 °F (35.4 °C)      Heart Rate 07/14/25 2300 71      Resp 07/14/25 2317 18      BP 07/14/25 2300 (!) 152/47      SpO2 07/14/25 2300 96 %      EtCO2 mmHg --       Height --       Weight 07/14/25 2317 226 lb 6.6 oz (102.7 kg)      Weight Scale Used --       BMI (Calculated) --       IBW/kg (Calculated) --        Physical Exam    Gen: in distress  Neuro: GCS E2 V1 M4, CN’s II-XII grossly intact  HEENT: NCAT, MMM  Neck: No JVD, full ROM,   PULM: elevated rate, increased wob, coarse BL  Cards: RRR, 2+ pulses,   Abd: ND, NTTP, softt  Back: Normal alignment, no CVAT,   Ext: grossly intact x 4, movement 5/5 strength,   Psych:  unable to assess  Skin: wwp, no rash,       Lab Results     Results for orders placed or performed during the hospital encounter of 07/14/25   Comprehensive Metabolic Panel    Specimen: Blood, Venous   Result Value Ref Range    Fasting Status      Sodium 129 (L) 135 - 145 mmol/L    Potassium 5.8 (H) 3.4 - 5.1 mmol/L    Chloride 93 (L) 97 - 110 mmol/L    Carbon Dioxide 36 (H) 21 - 32 mmol/L    Anion Gap 6 (L) 7 - 19 mmol/L    Glucose 150 (H) 70 - 99 mg/dL    BUN 56 (H) 6 - 20 mg/dL    Creatinine 1.08 0.67 - 1.17 mg/dL    Glomerular Filtration Rate 70 >=60    BUN/Cr 52 (H) 7 - 25    Calcium 9.3 8.4 - 10.2 mg/dL    Bilirubin, Total 0.6 0.2 - 1.0 mg/dL    GOT/AST 24 <=37 Units/L    GPT/ALT 47 <64 Units/L    Alkaline Phosphatase 121 (H) 45 - 117 Units/L    Albumin 2.7 (L) 3.4 - 5.0 g/dL    Protein, Total 6.1 (L) 6.4 - 8.2 g/dL    Globulin 3.4 2.0 - 4.0 g/dL    A/G Ratio 0.8 (L) 1.0 - 2.4   TROPONIN I, HIGH SENSITIVITY    Specimen: Blood, Venous   Result Value Ref Range     Troponin I, High Sensitivity 15 <77 ng/L   Partial Thromboplastin Time (PTT)    Specimen: Blood, Venous   Result Value Ref Range    PTT 32 22 - 32 sec   Prothrombin Time (INR/PT)    Specimen: Blood, Venous   Result Value Ref Range    Protime- PT 11.5 9.7 - 11.8 sec    INR 1.1     Lactic Acid Venous With Reflex    Specimen: Blood, Venous   Result Value Ref Range    Lactate, Venous 1.0 0.0 - 2.0 mmol/L   Lipase    Specimen: Blood, Venous   Result Value Ref Range    Lipase 33 15 - 77 Units/L   Magnesium    Specimen: Blood, Venous   Result Value Ref Range    Magnesium 2.1 1.7 - 2.4 mg/dL   Urinalysis with microscopy without culture    Specimen: Urine, Catheterized - Indwelling   Result Value Ref Range    COLOR, URINALYSIS Yellow     APPEARANCE, URINALYSIS Hazy     GLUCOSE, URINALYSIS Negative Negative mg/dL    BILIRUBIN, URINALYSIS Negative Negative    KETONES, URINALYSIS Negative Negative mg/dL    SPECIFIC GRAVITY, URINALYSIS >1.030 (H) 1.005 - 1.030    OCCULT BLOOD, URINALYSIS Small (A) Negative    PH, URINALYSIS 5.5 5.0 - 7.0    PROTEIN, URINALYSIS 100 (A) Negative mg/dL    UROBILINOGEN, URINALYSIS 2.0 (A) 0.2, 1.0 mg/dL    NITRITE, URINALYSIS Negative Negative    LEUKOCYTE ESTERASE, URINALYSIS Small (A) Negative    SQUAMOUS EPITHELIAL, URINALYSIS 1 to 5 None Seen, 1 to 5 /hpf    ERYTHROCYTES, URINALYSIS 26 to 100 (A) None Seen, 1 to 2 /hpf    LEUKOCYTES, URINALYSIS 11 to 25 (A) None Seen, 1 to 5 /hpf    BACTERIA, URINALYSIS Few (A) None Seen /hpf    HYALINE CASTS, URINALYSIS 6 to 10 (A) None Seen, 1 to 5 /lpf    YEAST Present (A) (none)    MUCUS Present    CBC with Automated Differential (performable only)    Specimen: Blood, Venous   Result Value Ref Range    WBC 15.1 (H) 4.2 - 11.0 K/mcL    RBC 4.77 4.50 - 5.90 mil/mcL    HGB 13.5 13.0 - 17.0 g/dL    HCT 41.7 39.0 - 51.0 %    MCV 87.4 78.0 - 100.0 fl    MCH 28.3 26.0 - 34.0 pg    MCHC 32.4 32.0 - 36.5 g/dL    RDW-CV 17.3 (H) 11.0 - 15.0 %    RDW-SD 53.9 (H) 39.0 -  50.0 fL     140 - 450 K/mcL    NRBC 0 <=0 /100 WBC   Manual Differential    Specimen: Blood, Venous   Result Value Ref Range    Neutrophil, Percent 70 %    Lymphocytes, Percent 4 %    Mono, Percent 8 %    Bands, Percent 18 (H) 0 - 10 %    Absolute Neutrophil 13.3 (H) 1.8 - 7.7 K/mcL    Absolute Lymphocytes 0.6 (L) 1.0 - 4.0 K/mcL    Absolute Monocytes 1.2 (H) 0.3 - 0.9 K/mcL    RBC Morphology Normal Normal    WBC Morphology Normal Normal    Platelet Morphology Normal Normal   GLUCOSE, BEDSIDE - POINT OF CARE    Specimen: Blood   Result Value Ref Range    GLUCOSE, BEDSIDE - POINT OF CARE 192 (H) 70 - 99 mg/dL   BLOOD GAS, ARTERIAL WITH COOXIMETRY - RESPIRATORY    Specimen: Blood, Arterial   Result Value Ref Range    BASE EXCESS / DEFICIT, ARTERIAL - RESPIRATORY 2 -2 - 3 mmol/L    HCO3, ARTERIAL - RESPIRATORY 28 22 - 28 mmol/L    O2 CONTENT, ARTERIAL - RESPIRATORY 16 15 - 23 %    PCO2, ARTERIAL - RESPIRATORY 51 (H) 35 - 48 mm Hg    PH, ARTERIAL - RESPIRATORY 7.35 7.35 - 7.45 Units    PO2, ARTERIAL - RESPIRATORY 289 (H) 83 - 108 mm Hg    O2 SATURATION, ARTERIAL - RESPIRATORY 100 (H) 95 - 99 %    CONDITION - RESPIRATORY CMV+ 18 500 5 100     CARBOXYHEMOGLOBIN - RESPIRATORY 1.8 <2.1 %    METHEMOGLOBIN - RESPIRATORY 0.4 <=1.6 %    OXYHEMOGLOBIN, ARTERIAL - RESPIRATORY 97.8 94.0 - 98.0 %    SITE - RESPIRATORY Left Radial     TEMPERATURE - RESPIRATORY 37.0 degrees C   LACTIC ACID, ARTERIAL - RESPIRATORY    Specimen: Blood, Arterial   Result Value Ref Range    LACTIC ACID, ARTERIAL - RESPIRATORY 1.3 <1.6 mmol/L   CALCIUM, IONIZED - RESPIRATORY    Specimen: Blood   Result Value Ref Range    CALCIUM, IONIZED - RESPIRATORY 1.34 (H) 1.15 - 1.29 mmol/L   ELECTROLYTE PANEL - RESPIRATORY    Specimen: Blood   Result Value Ref Range    SODIUM - RESPIRATORY 125 (L) 135 - 145 mmol/L    POTASSIUM - RESPIRATORY 5.3 (H) 3.4 - 5.1 mmol/L    CHLORIDE - RESPIRATORY 93 (L) 97 - 110 mmol/L   GLUCOSE - RESPIRATORY    Specimen: Blood    Result Value Ref Range    GLUCOSE - RESPIRATORY 254 (H) 65 - 99 mg/dL   HEMATOCRIT - RESPIRATORY    Specimen: Blood   Result Value Ref Range    HEMATOCRIT - RESPIRATORY 32.0 (L) 39.0 - 51.0 %   HEMOGLOBIN - RESPIRATORY    Specimen: Blood   Result Value Ref Range    HEMOGLOBIN - RESPIRATORY 10.7 (L) 13.0 - 17.0 g/dL       EKG Results     Encounter Date: 07/14/25   Electrocardiogram 12-Lead   Result Value    Ventricular Rate EKG/Min (BPM) 83    QRS-Interval (MSEC) 122    QT-Interval (MSEC) 360    QTc 423    R Axis (Degrees) -43    T Axis (Degrees) 86    REPORT TEXT      Atrial fibrillation  Left axis deviation  Left bundle branch block  Abnormal ECG  When compared with ECG of  14-JUL-2025 02:28,  Left bundle branch block  is now  present         EKG Interpretation: no STEMI  Cardiac monitor: nsr per my interpretation.     Radiology Results     Imaging Results              CT HEAD WO CONTRAST (Final result)  Result time 07/15/25 01:39:04      Final result                   Impression:    1.   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 No significant mass effect or midline shift.  2.   No evidence of acute intracranial hemorrhage.  3.   Atrophy with chronic microvascular ischemic changes    Electronically Signed by: JENNA HOFFMAN MD   Signed on: 7/15/2025 1:39 AM   Workstation ID: [id number]               Narrative:    PROCEDURE:  CT HEAD WITHOUT CONTRAST,  CT HEAD WO CONTRAST    COMPARISON: 7/14/2025    INDICATION:    AMS    TECHNIQUE:  After obtaining the patient' s consent, CT images were obtained  through the head without contrast material. Dose reduction techniques were  utilized.    FINDINGS:     CEREBRUM: Stable bilateral holohemispheric extra-axial low attenuating  fluid collections consistent with either  chronic subdural hematomas or  chronic dural hygromas with maximal thickness on the right measuring 4 mm  and on the left measuring 3 mm. No significant mass effect or midline  shift. Atrophy with scattered periventricular matter changes. No evidence  for acute intracranial hemorrhage, mass effect, or edema.  CEREBELLUM:  No edema, hemorrhage, mass, acute infarction, or inappropriate  atrophy.    BRAINSTEM:  No edema, hemorrhage, mass, acute infarction, or inappropriate  atrophy.    CSF SPACES:  Ventricles, cisterns, and sulci are appropriate for age.  No  hydrocephalus, subarachnoid hemorrhage, or mass.    SKULL:  No fracture, mass or other significant visible lesion. Calvarium is  normally shaped. Sutures are normal in appearance.    SINUSES:  Limited views demonstrate no significant mucosal thickening or  fluid.    MASTOIDS:  No mastoid effusions.  ORBITS:  Limited views are unremarkable.    OTHER:  Negative                                       XR CHEST AP OR PA (Final result)  Result time 07/14/25 23:54:05      Final result                   Impression:    Left basilar airspace opacity representing atelectasis with pneumonia  excluded with presence of probable small left effusion.  Patient intubated with tip of the ET tube approximately 4.7 cm above the  liat      Electronically Signed by: JENNA HOFFMAN MD   Signed on: 7/14/2025 11:54 PM   Workstation ID: [id number]               Narrative:    PROCEDURE:  CHEST XRAY,  XR CHEST AP OR PA    COMPARISON:  None available    INDICATIONS:    S/P ETT    FINDINGS:       LUNGS: Left basilar airspace opacity representing atelectasis with  pneumonia excluded with presence of probable small left effusion.  PULM. VASC.:  Unremarkable pulmonary vasculature.   CARDIAC:  No cardiac silhouette abnormality or cardiomegaly.      RONNY and                     MEDIASTINUM:  No visible mass or adenopathy. No pneumomediastinum. Trachea  is midline.    PLEURA:  No  pleural  thickening. No Pneumothorax.   BONES:  No fracture or visible bony lesion.  OTHER: Patient intubated with tip of the ET tube approximately 4.7 cm above  the liat                                      ED Course     Critical Care    Performed by: Alden Stark MD  Authorized by: Alden Stark MD    Critical care provider statement:     Critical care time (minutes):  90    Critical care time was exclusive of:  Separately billable procedures and treating other patients and teaching time    Critical care was necessary to treat or prevent imminent or life-threatening deterioration of the following conditions:  Respiratory failure and CNS failure or compromise    Critical care was time spent personally by me on the following activities:  Discussions with primary provider, discussions with consultants, evaluation of patient's response to treatment, examination of patient, review of old charts, re-evaluation of patient's condition, pulse oximetry, ordering and review of radiographic studies and ordering and review of laboratory studies    Care discussed with: admitting provider    Intubation    Date/Time: 7/14/2025 10:48 PM    Performed by: Alden Stark MD  Authorized by: Alden Stark MD    Consent:     Consent obtained:  Verbal    Consent given by:  Patient    Risks, benefits, and alternatives were discussed: yes      Risks discussed:  Aspiration, hypoxia, brain injury and death    Alternatives discussed:  No treatment  Pre-procedure details:     Indications: airway protection, altered consciousness and respiratory distress      Patient status:  Altered mental status    Look externally: no concerns      Mouth opening - incisor distance:  3 or more finger widths    Hyoid-mental distance: 3 or more finger widths      Hyoid-thyroid distance: 2 or more finger widths      Mallampati score:  II    Obstruction: none      Neck mobility: normal      Pharmacologic strategy: RSI      Induction agents:  Etomidate     Paralytics:  Rocuronium  Procedure details:     Preoxygenation:  Nonrebreather mask    CPR in progress: no      Number of attempts:  1  Successful intubation attempt details:     Intubation technique: video assisted      Laryngoscope blade:  Hypercurved    Bougie used: no      Grade view: I      Tube size (mm):  7.5    Tube type:  Cuffed    Tube visualized through cords: yes    Placement assessment:     ETT at teeth/gumline (cm):  25    Tube secured with:  ETT wu    Breath sounds:  Equal    Placement verification: chest rise, colorimetric ETCO2 and direct visualization      CXR findings:  High      ED Medication Orders (From admission, onward)      Ordered Start     Status Ordering Provider    07/14/25 2334 07/15/25 0400  CARBOXYMethylcellulose (REFRESH TEARS) 0.5 % ophthalmic solution 1 drop  6 times per day         Acknowledged REESE SANDERSON    07/15/25 0310 07/15/25 0311  NORepinephrine (LEVOPHED) 16 mg/250 mL in sodium chloride 0.9 % infusion  CONTINUOUS         Ordered REESE SANDERSON    07/15/25 0129 07/15/25 0130  sodium chloride (NORMAL SALINE) 0.9 % bolus 1,000 mL  ONCE         Last MAR action: Completed REESE SANDERSON    07/15/25 0129 07/15/25 0130  sodium chloride (NORMAL SALINE) 0.9 % bolus 1,000 mL  ONCE         Last MAR action: Completed REESE SANDERSON    07/15/25 0111 07/15/25 0112  sodium chloride (NORMAL SALINE) 0.9 % bolus 1,000 mL  ONCE         Last MAR action: Completed REESE SANDERSON    07/15/25 0053 07/15/25 0054  hydroCORTisone (Solu-CORTEF) PF injection 100 mg  ONCE         Last MAR action: Given REESE SANDERSON    07/14/25 2337 07/14/25 2343  insulin regular human (HumuLIN R) (diluted) 1 unit/mL injection 5 Units  (Insulin regular (IV) and dextrose for hyperkalemia)  ONCE         Last MAR action: Given REESE SANDERSON    07/14/25 2337 07/14/25 2338  dextrose 50 % injection 25 g  (Insulin regular (IV) and dextrose for hyperkalemia)  ONCE         Last MAR action: Given REESE SANDERSON     07/14/25 2337 07/14/25 2338  furosemide (LASIX INJECT) injection 40 mg  ONCE         Last MAR action: Given REESE SANDERSON    07/14/25 2337 07/14/25 2338  calcium gluconate 2 g in sodium chloride 100 mL IVPB (Ordered as: CALCIUM GLUCONATE IVPB ORDERABLE)  ONCE         Last MAR action: Completed REESE SANDERSON    07/14/25 2336 07/14/25 2337  ceFEPIme (MAXIPIME) 2,000 mg in sodium chloride 0.9 % 100 mL IVPB  ONCE         Last MAR action: Completed REESE SANDERSON    07/14/25 2336 07/14/25 2336  Vancomycin Pharmacy to Manage  SEE ADMIN INSTRUCTIONS         Acknowledged REESE SANDERSON    07/14/25 2334 07/14/25 2335  propofol (DIPRIVAN) infusion  (Propofol)  CONTINUOUS         Last MAR action: Rate/Dose Change REESE SANDERSON    07/14/25 2334 07/14/25 2334  fentaNYL (SUBLIMAZE) injection  mcg  (FentaNYL)  EVERY 15 MIN PRN         Last MAR action: Given REESE SANDERSON    07/15/25 0007 07/14/25 2302  etomidate (AMIDATE) injection 10 mg  ONCE         Last MAR action: Given REESE SANDERSON    07/15/25 0007 07/14/25 2302  rocuronium injection 70 mg  ONCE         Last MAR action: Given REESE SANDERSON            Vitals:    07/15/25 0215 07/15/25 0230 07/15/25 0245 07/15/25 0300   BP: (!) 85/51 93/77 99/53 (!) 89/47   Pulse: 69 72 63 66   Resp: 18 18  18   Temp: 95.9 °F (35.5 °C) 95.9 °F (35.5 °C) 95.7 °F (35.4 °C) 95.7 °F (35.4 °C)   TempSrc:       SpO2: 100% 98% 96% 96%   Weight:           Does the patient POSSIBLY have sepsis? NO. I am not concerned for sepsis in this patient. Explain: no.     Definitions  Sepsis = 2 SIRS + source of infection  Severe sepsis = sepsis + end organ dysfunction (lactic > 2, elevated creatinine, INR > 1.5, resp failure etc)  Septic shock =  persistent hypotension with SBP < 90 or MAP < 65, OR lactic > 4.0   If patient remains hypotensive after fluid bolus, vasopressor must be ordered within 1 hour    Medical Decision Making        Melanie Burns is a 78 year old male who presented  with   Chief Complaint   Patient presents with    Respiratory Distress    Altered Mental Status    Patient arrives in distress..     ED Course as of 07/15/25 0316   Mon Jul 14, 2025   2336 Prior to intubation of patient I was able to discuss with the wife if there were any known wishes to be DNR/DNI.  She denied there being a sense wishes and recommended the patient be fully resuscitated.  Patient was intubated without difficulty using etomidate and rocuronium at reduced doses. [JS]   Tue Jul 15, 2025   0129 Unclear what elements of the patient's clinical presentation are due to new insult such as infection or older issues such as chronic steroid use.  In addition to giving him fluids we are giving stress doses of IV steroids. [JS]   0146 78M who arrived AMS / resp distress w/ EMS from nursing home today. Was seen for somewhat similar symptoms last night, dc'd home at around 0600 earlier today. Rec'd multiple rounds of narcan w/o improvement. Was not adequatley protecting his airway so he was intubated. No clear cause of his AMS at at his time. He is on chronic steroids for some adrenal issues -- giving him stress doses now in addition to sepsis fluids and abx.  [JS]   0251 Lactic acid normal.  Vital signs improving.  Overall fairly satisfied with degree of fluid resuscitation and its effect.  Comfortable stopping resuscitation with fluids in the ED at the 30/kg rich. [JS]   0310 Urine output assessed to be inadequate, will give additional 500cc of NS and start norepi w/ goal MAP of 65. [JS]      ED Course User Index  [JS] Alden Stark MD       Results of my diagnostic evaluation were explained to the patient and/or family members. Patient and/or family were given the opportunity to ask questions and are in agreement with plan of care.        Clinical Impression     ED Diagnosis   1. Altered mental status, unspecified altered mental status type        2. Acute hypoxic respiratory failure  (CMD)             Disposition        Admit 7/15/2025  1:47 AM  Telemetry Bed?: Yes  Admitting Physician: PALOMO PATEL [[id number]]  Is this a telephone or verbal order?: This is a telephone order from the admitting physician    Medications   docusate sodium-sennosides (SENOKOT S) 50-8.6 MG 2 tablet (has no administration in time range)   polyethylene glycol (MIRALAX) packet 17 g (has no administration in time range)     Or   bisacodyl (DULCOLAX) suppository 10 mg (has no administration in time range)   CARBOXYMethylcellulose (REFRESH TEARS) 0.5 % ophthalmic solution 1 drop (has no administration in time range)   CARBOXYMethylcellulose (REFRESH TEARS) 0.5 % ophthalmic solution 1 drop (has no administration in time range)   fentaNYL (SUBLIMAZE) injection  mcg (50 mcg Intravenous Given 7/15/25 0034)   propofol (DIPRIVAN) infusion (30 mcg/kg/min × 106 kg (Order-Specific) Intravenous Rate/Dose Change 7/15/25 0041)   Vancomycin Pharmacy to Manage (has no administration in time range)   vancomycin (VANCOCIN) 1,000 mg in sodium chloride 0.9 % 250 mL IVPB (has no administration in time range)   hydroCORTisone (Solu-CORTEF) PF injection 50 mg (has no administration in time range)   cefTRIAXone (ROCEPHIN) 2,000 mg in sterile water (preservative free) IV syringe (has no administration in time range)   NORepinephrine (LEVOPHED) 16 mg/250 mL in sodium chloride 0.9 % infusion (has no administration in time range)   sodium chloride (NORMAL SALINE) 0.9 % bolus 500 mL (has no administration in time range)   ceFEPIme (MAXIPIME) 2,000 mg in sodium chloride 0.9 % 100 mL IVPB (0 mg Intravenous Completed 7/15/25 0035)   dextrose 50 % injection 25 g (25 g Intravenous Given 7/14/25 2349)   insulin regular human (HumuLIN R) (diluted) 1 unit/mL injection 5 Units (5 Units Intravenous Given 7/14/25 2349)   furosemide (LASIX INJECT) injection 40 mg (40 mg Intravenous Given 7/14/25 2355)   calcium gluconate 2 g in sodium chloride 100 mL IVPB (Ordered  as: CALCIUM GLUCONATE IVPB ORDERABLE) (0 g Intravenous Completed 7/15/25 0016)   vancomycin 1,750 mg in sodium chloride 0.9% 500 mL IVPB (Ordered as: VANCOMYCIN IVPB (ORDERABLE)) (0 mg Intravenous Completed 7/15/25 0238)   etomidate (AMIDATE) injection 10 mg (10 mg Intravenous Given 7/14/25 2302)   rocuronium injection 70 mg (70 mg Intravenous Given 7/14/25 2303)   hydroCORTisone (Solu-CORTEF) PF injection 100 mg (100 mg Intravenous Given 7/15/25 0106)   sodium chloride (NORMAL SALINE) 0.9 % bolus 1,000 mL (0 mLs Intravenous Completed 7/15/25 0154)   sodium chloride (NORMAL SALINE) 0.9 % bolus 1,000 mL (0 mLs Intravenous Completed 7/15/25 0243)   sodium chloride (NORMAL SALINE) 0.9 % bolus 1,000 mL (0 mLs Intravenous Completed 7/15/25 0219)       New Prescriptions    No medications on file         Alden Stark MD   7/15/2025 11:09 PM     This note was made using voice dictation and may include inadvertent errors due to the dictation software. Please contact for clarification regarding any noted discrepancies.           Alden Stark MD  07/15/25 2547     no